# Patient Record
Sex: MALE | Race: WHITE | NOT HISPANIC OR LATINO | Employment: FULL TIME | URBAN - METROPOLITAN AREA
[De-identification: names, ages, dates, MRNs, and addresses within clinical notes are randomized per-mention and may not be internally consistent; named-entity substitution may affect disease eponyms.]

---

## 2019-12-26 ENCOUNTER — OFFICE VISIT (OUTPATIENT)
Dept: URGENT CARE | Facility: CLINIC | Age: 34
End: 2019-12-26

## 2019-12-26 VITALS
SYSTOLIC BLOOD PRESSURE: 137 MMHG | HEART RATE: 114 BPM | WEIGHT: 185 LBS | HEIGHT: 71 IN | OXYGEN SATURATION: 98 % | BODY MASS INDEX: 25.9 KG/M2 | TEMPERATURE: 100 F | DIASTOLIC BLOOD PRESSURE: 86 MMHG | RESPIRATION RATE: 17 BRPM

## 2019-12-26 DIAGNOSIS — L03.317 CELLULITIS OF BUTTOCK: Primary | ICD-10-CM

## 2019-12-26 PROCEDURE — 99214 PR OFFICE/OUTPT VISIT, EST, LEVL IV, 30-39 MIN: ICD-10-PCS | Mod: S$GLB,,, | Performed by: FAMILY MEDICINE

## 2019-12-26 PROCEDURE — 99214 OFFICE O/P EST MOD 30 MIN: CPT | Mod: S$GLB,,, | Performed by: FAMILY MEDICINE

## 2019-12-26 RX ORDER — SULFAMETHOXAZOLE AND TRIMETHOPRIM 800; 160 MG/1; MG/1
1 TABLET ORAL 2 TIMES DAILY
Qty: 20 TABLET | Refills: 0 | Status: SHIPPED | OUTPATIENT
Start: 2019-12-26 | End: 2020-01-05

## 2019-12-26 NOTE — PROGRESS NOTES
"Subjective:       Patient ID: Jim Patricia is a 34 y.o. male.    Vitals:  height is 5' 11" (1.803 m) and weight is 83.9 kg (185 lb). His oral temperature is 100.4 °F (38 °C) (abnormal). His blood pressure is 137/86 and his pulse is 114 (abnormal). His respiration is 17 and oxygen saturation is 98%.     Chief Complaint: Mass    Mass   This is a new problem. The current episode started in the past 7 days. The problem occurs constantly. The problem has been gradually worsening. Associated symptoms include a fever. Pertinent negatives include no arthralgias, chills, coughing, joint swelling, rash or sore throat. Nothing aggravates the symptoms. He has tried nothing for the symptoms.       Constitution: Positive for fever. Negative for chills.   HENT: Negative for facial swelling and sore throat.    Neck: Negative for painful lymph nodes.   Eyes: Negative for eye itching and eyelid swelling.   Respiratory: Negative for cough.    Musculoskeletal: Negative for joint pain and joint swelling.   Skin: Negative for color change, pale, rash, wound, abrasion, laceration, lesion, skin thickening/induration, puncture wound, abscess, avulsion and hives.   Allergic/Immunologic: Negative for environmental allergies, immunocompromised state and hives.   Hematologic/Lymphatic: Negative for swollen lymph nodes.       Objective:      Physical Exam   Constitutional: He is oriented to person, place, and time. He appears well-developed and well-nourished.   HENT:   Head: Normocephalic and atraumatic. Head is without abrasion, without contusion and without laceration.   Right Ear: External ear normal.   Left Ear: External ear normal.   Nose: Nose normal.   Mouth/Throat: Oropharynx is clear and moist and mucous membranes are normal.   Eyes: Pupils are equal, round, and reactive to light. Conjunctivae, EOM and lids are normal.   Neck: Trachea normal, full passive range of motion without pain and phonation normal. Neck supple. "   Cardiovascular: Normal rate, regular rhythm and normal heart sounds.   Pulmonary/Chest: Effort normal and breath sounds normal. No stridor. No respiratory distress.   Musculoskeletal: Normal range of motion.   Neurological: He is alert and oriented to person, place, and time.   Skin: Skin is warm, dry and intact. Capillary refill takes less than 2 seconds.   Right buttock has a 2-3 cm area of redness, tenderness, indurated, no drainage. abrasion, burn, bruising and ecchymosis  Psychiatric: He has a normal mood and affect. His speech is normal and behavior is normal. Judgment and thought content normal. Cognition and memory are normal.   Nursing note and vitals reviewed.        Assessment:       1. Cellulitis of buttock        Plan:         Cellulitis of buttock  -     sulfamethoxazole-trimethoprim 800-160mg (BACTRIM DS) 800-160 mg Tab; Take 1 tablet by mouth 2 (two) times daily. for 10 days  Dispense: 20 tablet; Refill: 0         Patient Instructions       Discharge Instructions for Cellulitis  You have been diagnosed with cellulitis. This is an infection in the deepest layer of the skin. In some cases, the infection also affects the muscle. Cellulitis is caused by bacteria. The bacteria can enter the body through broken skin. This can happen with a cut, scratch, animal bite, or an insect bite that has been scratched. You may have been treated in the hospital with antibiotics and fluids. You will likely be given a prescription for antibiotics to take at home. This sheet will help you take care of yourself at home.  Home care  When you are home:  · Take the prescribed antibiotic medicine you are given as directed until it is gone. Take it even if you feel better. It treats the infection and stops it from returning. Not taking all the medicine can make future infections hard to treat.  · Keep the infected area clean.  · When possible, raise the infected area above the level of your heart. This helps keep swelling  down.  · Talk with your healthcare provider if you are in pain. Ask what kind of over-the-counter medicine you can take for pain.  · Apply clean bandages as advised.  · Take your temperature once a day for a week.  · Wash your hands often to prevent spreading the infection.  In the future, wash your hands before and after you touch cuts, scratches, or bandages. This will help prevent infection.   When to call your healthcare provider  Call your healthcare provider immediately if you have any of the following:  · Difficulty or pain when moving the joints above or below the infected area  · Discharge or pus draining from the area  · Fever of 100.4°F (38°C) or higher, or as directed by your healthcare provider  · Pain that gets worse in or around the infected   · Redness that gets worse in or around the infected area, particularly if the area of redness expands to a wider area  · Shaking chills  · Swelling of the infected area  · Vomiting   Date Last Reviewed: 8/1/2016  © 4177-0900 [employer]. [address] [place] [address], [place] [address]. All rights reserved. This information is not intended as a substitute for professional medical care. Always follow your healthcare professional's instructions.      Follow up with your doctor in a few days as needed.  Return to the urgent care or go to the ER if symptoms get worse.    Jesse Juan MD

## 2019-12-26 NOTE — PATIENT INSTRUCTIONS
Discharge Instructions for Cellulitis  You have been diagnosed with cellulitis. This is an infection in the deepest layer of the skin. In some cases, the infection also affects the muscle. Cellulitis is caused by bacteria. The bacteria can enter the body through broken skin. This can happen with a cut, scratch, animal bite, or an insect bite that has been scratched. You may have been treated in the hospital with antibiotics and fluids. You will likely be given a prescription for antibiotics to take at home. This sheet will help you take care of yourself at home.  Home care  When you are home:  · Take the prescribed antibiotic medicine you are given as directed until it is gone. Take it even if you feel better. It treats the infection and stops it from returning. Not taking all the medicine can make future infections hard to treat.  · Keep the infected area clean.  · When possible, raise the infected area above the level of your heart. This helps keep swelling down.  · Talk with your healthcare provider if you are in pain. Ask what kind of over-the-counter medicine you can take for pain.  · Apply clean bandages as advised.  · Take your temperature once a day for a week.  · Wash your hands often to prevent spreading the infection.  In the future, wash your hands before and after you touch cuts, scratches, or bandages. This will help prevent infection.   When to call your healthcare provider  Call your healthcare provider immediately if you have any of the following:  · Difficulty or pain when moving the joints above or below the infected area  · Discharge or pus draining from the area  · Fever of 100.4°F (38°C) or higher, or as directed by your healthcare provider  · Pain that gets worse in or around the infected   · Redness that gets worse in or around the infected area, particularly if the area of redness expands to a wider area  · Shaking chills  · Swelling of the infected area  · Vomiting   Date Last Reviewed:  8/1/2016  © 4465-6237 [employer]. [address] [place] [address], [place] [address]. All rights reserved. This information is not intended as a substitute for professional medical care. Always follow your healthcare professional's instructions.      Follow up with your doctor in a few days as needed.  Return to the urgent care or go to the ER if symptoms get worse.    Jesse Juan MD

## 2020-05-08 ENCOUNTER — OFFICE VISIT (OUTPATIENT)
Dept: URGENT CARE | Facility: CLINIC | Age: 35
End: 2020-05-08

## 2020-05-08 VITALS
HEIGHT: 71 IN | SYSTOLIC BLOOD PRESSURE: 144 MMHG | BODY MASS INDEX: 25.9 KG/M2 | DIASTOLIC BLOOD PRESSURE: 84 MMHG | HEART RATE: 98 BPM | TEMPERATURE: 98 F | OXYGEN SATURATION: 98 % | WEIGHT: 185 LBS

## 2020-05-08 DIAGNOSIS — S93.402A SPRAIN OF LEFT ANKLE, UNSPECIFIED LIGAMENT, INITIAL ENCOUNTER: Primary | ICD-10-CM

## 2020-05-08 DIAGNOSIS — S99.912A INJURY OF LEFT ANKLE, INITIAL ENCOUNTER: ICD-10-CM

## 2020-05-08 PROCEDURE — 73610 X-RAY EXAM OF ANKLE: CPT | Mod: FY,LT,S$GLB, | Performed by: RADIOLOGY

## 2020-05-08 PROCEDURE — 99203 PR OFFICE/OUTPT VISIT, NEW, LEVL III, 30-44 MIN: ICD-10-PCS | Mod: TIER,S$GLB,, | Performed by: NURSE PRACTITIONER

## 2020-05-08 PROCEDURE — 73610 XR ANKLE COMPLETE 3 VIEW LEFT: ICD-10-PCS | Mod: FY,LT,S$GLB, | Performed by: RADIOLOGY

## 2020-05-08 PROCEDURE — 99203 OFFICE O/P NEW LOW 30 MIN: CPT | Mod: TIER,S$GLB,, | Performed by: NURSE PRACTITIONER

## 2020-05-08 RX ORDER — OMEPRAZOLE 10 MG/1
10 CAPSULE, DELAYED RELEASE ORAL DAILY
COMMUNITY

## 2020-05-08 NOTE — PATIENT INSTRUCTIONS
Ortho   If your condition worsens or fails to improve we recommend that you receive another evaluation at the ER immediately or contact your PCP to discuss your concerns or return here. You must understand that you've received an urgent care treatment only and that you may be released before all your medical problems are known or treated. You the patient will arrange for follouwp care as instructed.   If you were prescribed a narcotic or muscle relaxant do not drive or operate heavy machinery while taking these medications   Tylenol or ibuprofen can also be used as directed for pain unless you have an allergy to them or medical condition such as stomach ulcers, kidney or liver disease or blood thinners etc for which you should not be taking these type of medications.   If you were given a prescription NSAID here do not also take any over the counter NSAID like ibuprofen, aleve, advil, motrin etc   RICE which means rest, ice compression and elevation are helpful.   If you have Low Back Pain and develop bowel or bladder symptoms or increase pain going down your legs go to the ER immediately.   If you were given a splint wear it at all times.   If you were given crutches use them as we instructed. Do not rest your armpits on the foam pad or you risk compressing the nerves and the vessels there       Ankle Sprain (Adult)  An ankle sprain is a stretching or tearing of the ligaments that hold the ankle joint together. There are no broken bones.  An ankle sprain is a common injury for both children and adults. It happens when the ankle turns, twists, or rolls in an awkward way. This can be caused by a sports injury. Or it can happen from doing something as simple as stepping on an uneven surface.  Ligaments are made of tough connective tissue. Normally, ligaments stretch a certain amount and then go back to their normal place. A sprain happens when a ligament is  forced to stretch more than the normal amount. A severe sprain can actually tear the ligaments. If you have a severe sprain, you may have felt or heard something like a pop when you were injured.  Ankle sprains are given a grade depending on whether they are mild, moderate, or severe:  · Grade 1 sprain. A mild sprain with minor stretching and damage to the ligament.  · Grade 2 sprain. A moderate sprain where the ligament is partly torn.  · Grade 3 sprain. The most severe kind of sprain. The ligament is completely torn.  Most sprains take about 4 to 6 weeks to heal. A severe sprain can take several months to recover.  Your healthcare provider may order X-rays to be sure you dont have a fracture, or broken bone.  The injured area will feel sore.  Swelling and pain may make it hard to walk. You may need crutches if walking is painful. Or your provider may have you use a cast boot or air splint. This will depend on the grade of ankle sprain that you have.    Home care  · For a Grade 1 sprain, use RICE (rest, ice, compression, and elevation):  · Rest your ankle. Dont walk on it.  · Ice should be used right away to help control swelling. Place an ice pack over the injured area for 20 minutes. Do this every 3 to 6 hours for the first 24 to 48 hours. Keep using ice packs to ease pain and swelling as needed. To make an ice pack, put ice cubes in a plastic bag that seals at the top. Wrap the bag in a clean, thin towel or cloth. Never put ice or an ice pack directly on the skin. The ice pack can be put right on the cast, bandage, or splint. As the ice melts, be careful that the cast, bandage, or splint doesnt get wet. If you have a boot, open it to apply an ice pack, unless told otherwise by your provider.  · Compression devices help to control swelling. They also keep the ankle from moving and support your injured ankle. These devices include dressings, bandages, and wraps.  · Elevate or raise your ankle above the level of  your heart when sitting or lying down. This is very important for the first 48 hours.  · Follow the RICE guidelines for a Grade 2 sprain. This type of sprain will take longer to heal. Your provider may have you wear a splint, cast, or brace to keep your ankle from moving.  ·  If you have a Grade 3 sprain, you are at risk for long-term ankle instability. In rare cases, surgery may be needed. Your provider may have you wear a short leg cast or a walking boot for 2 to 3 weeks.  · After 48 hours, it may be helpful to apply heat for 20 minutes several times a day. You can do this with a heating pad or warm compress. Or you may want to go back and forth between using ice and heat. Never apply heat directly to the skin. Always wrap the heating pad or warm compress in a clean, thin towel or cloth.  · You may use over-the-counter pain medicine (NSAIDS or nonsteroidal anti-inflammatory drugs) to control pain, unless another pain medicine was prescribed. Talk with your provider before using these medicines if you have chronic liver or kidney disease, or have ever had a stomach ulcer or GI (gastrointestinal) bleeding.  · Follow any rehabilitation exercises your provider gives you. These can help you be more flexible and improve your balance and coordination. This is helpful in preventing long-term ankle problems.  Prevention  To help prevent ankle sprains, its important to have good strength, balance, and flexibility. Be sure to:  · Always warm up before you exercise or do something very active  · Be careful when walking or running on uneven or cracked surfaces  · Wear shoes that are in good condition and fit well  · Listen to your bodys signals to slow down when you are in pain or tired  Follow-up care  Any X-rays you had today dont show any broken bones, breaks, or fractures. Sometimes fractures dont show up on the first X-ray. Bruises and sprains can sometimes hurt as much as a fracture. These injuries can take time to  heal completely. If your symptoms dont get better or they get worse, talk with your healthcare provider. You may need a repeat X-ray.  Follow up with your healthcare provider, or as advised. Check for any warning signs listed below.  When to seek medical advice  Call your healthcare provider right away if any of these occur:  · Fever of 100.4 F (38 C) or higher, or as directed by your healthcare provider  · The injury doesnt seem to be healing  · The swelling comes back  · The cast has a bad smell  · The plaster cast or splint gets wet or soft  · The fiberglass cast or splint gets wet and does not dry for 24 hours  · The pain or swelling increases, or redness appears  · Your toes become cold, blue, numb, or tingly  · The skin is discolored (looks blue, purple, or gray), has blisters, or is irritated  · You re-injure your ankle  Date Last Reviewed: 11/20/2015  © 7862-0072 [employer]. [address] [place] [address], [place] [address]. All rights reserved. This information is not intended as a substitute for professional medical care. Always follow your healthcare professional's instructions.

## 2020-05-08 NOTE — LETTER
May 8, 2020      [facility]  [address] [facility] [address]  Phone: [phone]  Fax: [phone]       Patient: Jim Patricia   YOB: 1985  Date of Visit: 05/08/2020    To Whom It May Concern:    Steven Patricia  was at [facility] on 05/08/2020.  He is okay to return to work as scheduled on Wednesday 5/13/20. If you have any questions or concerns, or if I can be of further assistance, please do not hesitate to contact me.    Sincerely,        Marilee Ribeiro NP

## 2020-05-08 NOTE — PROGRESS NOTES
"Subjective:       Patient ID: Jim Patricia is a 34 y.o. male.    Vitals:  height is 5' 11" (1.803 m) and weight is 83.9 kg (185 lb). His tympanic temperature is 98.4 °F (36.9 °C). His blood pressure is 144/84 (abnormal) and his pulse is 98. His oxygen saturation is 98%.     Chief Complaint: Ankle Injury    Patient presents with c/o having an injury to his left ankle. Patient states that he was trying to stay busy at home when he tripped over a concrete slab and rolled his ankle yesterday morning. Patient has swelling and discoloration. Patient did ice his ankle and kept a wrap on it. Patient took tylenol and aleve and had some relief.   Patient is able to move his ankle up and down with no pain but any other movement causing pain.     Ankle Injury    The incident occurred 12 to 24 hours ago. The incident occurred at home. The pain is present in the left ankle. The pain is at a severity of 0/10. The patient is experiencing no pain. Pertinent negatives include no inability to bear weight, loss of motion, loss of sensation, muscle weakness, numbness or tingling. He reports no foreign bodies present. The symptoms are aggravated by movement. He has tried acetaminophen for the symptoms. The treatment provided no relief.       Constitution: Negative for fatigue.   HENT: Negative for facial swelling and facial trauma.    Neck: Negative for neck stiffness.   Cardiovascular: Negative for chest trauma.   Eyes: Negative for eye trauma, double vision and blurred vision.   Gastrointestinal: Negative for abdominal trauma, abdominal pain and rectal bleeding.   Genitourinary: Negative for hematuria, genital trauma and pelvic pain.   Musculoskeletal: Positive for pain, joint swelling and pain with walking. Negative for trauma and abnormal ROM of joint.   Skin: Positive for color change. Negative for wound, abrasion and laceration.   Neurological: Negative for dizziness, history of vertigo, light-headedness, coordination " disturbances, altered mental status, loss of consciousness and numbness.   Hematologic/Lymphatic: Negative for history of bleeding disorder.   Psychiatric/Behavioral: Negative for altered mental status.       Objective:      Physical Exam   Constitutional: He is oriented to person, place, and time. He appears well-developed and well-nourished. He is cooperative.  Non-toxic appearance. He does not appear ill. No distress.   HENT:   Head: Normocephalic and atraumatic. Head is without abrasion, without contusion and without laceration.   Right Ear: Hearing, tympanic membrane, external ear and ear canal normal. No hemotympanum.   Left Ear: Hearing, tympanic membrane, external ear and ear canal normal. No hemotympanum.   Nose: Nose normal. No mucosal edema, rhinorrhea or nasal deformity. No epistaxis. Right sinus exhibits no maxillary sinus tenderness and no frontal sinus tenderness. Left sinus exhibits no maxillary sinus tenderness and no frontal sinus tenderness.   Mouth/Throat: Uvula is midline, oropharynx is clear and moist and mucous membranes are normal. No trismus in the jaw. Normal dentition. No uvula swelling. No posterior oropharyngeal erythema.   Eyes: Pupils are equal, round, and reactive to light. Conjunctivae, EOM and lids are normal. Right eye exhibits no discharge. Left eye exhibits no discharge. No scleral icterus.   Neck: Trachea normal, normal range of motion, full passive range of motion without pain and phonation normal. Neck supple. No spinous process tenderness and no muscular tenderness present. No neck rigidity. No tracheal deviation present.   Cardiovascular: Normal rate, regular rhythm, normal heart sounds, intact distal pulses and normal pulses.   Pulmonary/Chest: Effort normal and breath sounds normal. No respiratory distress.   Abdominal: Soft. Normal appearance and bowel sounds are normal. He exhibits no distension, no pulsatile midline mass and no mass. There is no tenderness.    Musculoskeletal: Normal range of motion. He exhibits no edema or deformity.        Left ankle: He exhibits swelling. He exhibits normal range of motion, no ecchymosis, no deformity, no laceration and normal pulse. Tenderness. Lateral malleolus tenderness found. No medial malleolus, no AITFL, no CF ligament, no posterior TFL, no head of 5th metatarsal and no proximal fibula tenderness found. Achilles tendon normal.        Left foot: Normal.   Neurological: He is alert and oriented to person, place, and time. He has normal strength. No cranial nerve deficit or sensory deficit. He exhibits normal muscle tone. He displays no seizure activity. Coordination normal. GCS eye subscore is 4. GCS verbal subscore is 5. GCS motor subscore is 6.   Skin: Skin is warm, dry, intact, not diaphoretic and not pale. Capillary refill takes less than 2 seconds. not left ankleLesions:  bruisingabrasion, burn and ecchymosis  Psychiatric: He has a normal mood and affect. His speech is normal and behavior is normal. Judgment and thought content normal. Cognition and memory are normal.   Nursing note and vitals reviewed.  X-ray Ankle Complete Left    Result Date: 5/8/2020  EXAMINATION: XR ANKLE COMPLETE 3 VIEW LEFT CLINICAL HISTORY: Unspecified injury of left ankle, initial encounter TECHNIQUE: AP, lateral and oblique views of the left ankle were performed. COMPARISON: None FINDINGS: Bones are well mineralized. Overall alignment is within normal limits.  Nonspecific soft tissue swelling overlying the lateral malleolus.  Ankle mortise is otherwise well aligned and intact.  No displaced fracture, dislocation or destructive osseous process. Joint spaces appear relatively maintained. No subcutaneous emphysema or radiodense retained foreign body.     Lateral left ankle nonspecific soft tissue swelling without acute displaced fracture-dislocation identified, which may represent sprain. Electronically signed by: Henrry Good MD Date:    05/08/2020  Time:    18:35      Assessment:       1. Sprain of left ankle, unspecified ligament, initial encounter    2. Injury of left ankle, initial encounter        Plan:         Sprain of left ankle, unspecified ligament, initial encounter    Injury of left ankle, initial encounter  -     X-Ray Ankle Complete Left; Future; Expected date: 05/08/2020      Patient Instructions                                                              Ortho   If your condition worsens or fails to improve we recommend that you receive another evaluation at the ER immediately or contact your PCP to discuss your concerns or return here. You must understand that you've received an urgent care treatment only and that you may be released before all your medical problems are known or treated. You the patient will arrange for follouw care as instructed.   If you were prescribed a narcotic or muscle relaxant do not drive or operate heavy machinery while taking these medications   Tylenol or ibuprofen can also be used as directed for pain unless you have an allergy to them or medical condition such as stomach ulcers, kidney or liver disease or blood thinners etc for which you should not be taking these type of medications.   If you were given a prescription NSAID here do not also take any over the counter NSAID like ibuprofen, aleve, advil, motrin etc   RICE which means rest, ice compression and elevation are helpful.   If you have Low Back Pain and develop bowel or bladder symptoms or increase pain going down your legs go to the ER immediately.   If you were given a splint wear it at all times.   If you were given crutches use them as we instructed. Do not rest your armpits on the foam pad or you risk compressing the nerves and the vessels there       Ankle Sprain (Adult)  An ankle sprain is a stretching or tearing of the ligaments that hold the ankle joint together. There are no broken bones.  An ankle sprain is a common injury for both children  and adults. It happens when the ankle turns, twists, or rolls in an awkward way. This can be caused by a sports injury. Or it can happen from doing something as simple as stepping on an uneven surface.  Ligaments are made of tough connective tissue. Normally, ligaments stretch a certain amount and then go back to their normal place. A sprain happens when a ligament is forced to stretch more than the normal amount. A severe sprain can actually tear the ligaments. If you have a severe sprain, you may have felt or heard something like a pop when you were injured.  Ankle sprains are given a grade depending on whether they are mild, moderate, or severe:  · Grade 1 sprain. A mild sprain with minor stretching and damage to the ligament.  · Grade 2 sprain. A moderate sprain where the ligament is partly torn.  · Grade 3 sprain. The most severe kind of sprain. The ligament is completely torn.  Most sprains take about 4 to 6 weeks to heal. A severe sprain can take several months to recover.  Your healthcare provider may order X-rays to be sure you dont have a fracture, or broken bone.  The injured area will feel sore.  Swelling and pain may make it hard to walk. You may need crutches if walking is painful. Or your provider may have you use a cast boot or air splint. This will depend on the grade of ankle sprain that you have.    Home care  · For a Grade 1 sprain, use RICE (rest, ice, compression, and elevation):  · Rest your ankle. Dont walk on it.  · Ice should be used right away to help control swelling. Place an ice pack over the injured area for 20 minutes. Do this every 3 to 6 hours for the first 24 to 48 hours. Keep using ice packs to ease pain and swelling as needed. To make an ice pack, put ice cubes in a plastic bag that seals at the top. Wrap the bag in a clean, thin towel or cloth. Never put ice or an ice pack directly on the skin. The ice pack can be put right on the cast, bandage, or splint. As the ice melts, be  careful that the cast, bandage, or splint doesnt get wet. If you have a boot, open it to apply an ice pack, unless told otherwise by your provider.  · Compression devices help to control swelling. They also keep the ankle from moving and support your injured ankle. These devices include dressings, bandages, and wraps.  · Elevate or raise your ankle above the level of your heart when sitting or lying down. This is very important for the first 48 hours.  · Follow the RICE guidelines for a Grade 2 sprain. This type of sprain will take longer to heal. Your provider may have you wear a splint, cast, or brace to keep your ankle from moving.  ·  If you have a Grade 3 sprain, you are at risk for long-term ankle instability. In rare cases, surgery may be needed. Your provider may have you wear a short leg cast or a walking boot for 2 to 3 weeks.  · After 48 hours, it may be helpful to apply heat for 20 minutes several times a day. You can do this with a heating pad or warm compress. Or you may want to go back and forth between using ice and heat. Never apply heat directly to the skin. Always wrap the heating pad or warm compress in a clean, thin towel or cloth.  · You may use over-the-counter pain medicine (NSAIDS or nonsteroidal anti-inflammatory drugs) to control pain, unless another pain medicine was prescribed. Talk with your provider before using these medicines if you have chronic liver or kidney disease, or have ever had a stomach ulcer or GI (gastrointestinal) bleeding.  · Follow any rehabilitation exercises your provider gives you. These can help you be more flexible and improve your balance and coordination. This is helpful in preventing long-term ankle problems.  Prevention  To help prevent ankle sprains, its important to have good strength, balance, and flexibility. Be sure to:  · Always warm up before you exercise or do something very active  · Be careful when walking or running on uneven or cracked  surfaces  · Wear shoes that are in good condition and fit well  · Listen to your bodys signals to slow down when you are in pain or tired  Follow-up care  Any X-rays you had today dont show any broken bones, breaks, or fractures. Sometimes fractures dont show up on the first X-ray. Bruises and sprains can sometimes hurt as much as a fracture. These injuries can take time to heal completely. If your symptoms dont get better or they get worse, talk with your healthcare provider. You may need a repeat X-ray.  Follow up with your healthcare provider, or as advised. Check for any warning signs listed below.  When to seek medical advice  Call your healthcare provider right away if any of these occur:  · Fever of 100.4 F (38 C) or higher, or as directed by your healthcare provider  · The injury doesnt seem to be healing  · The swelling comes back  · The cast has a bad smell  · The plaster cast or splint gets wet or soft  · The fiberglass cast or splint gets wet and does not dry for 24 hours  · The pain or swelling increases, or redness appears  · Your toes become cold, blue, numb, or tingly  · The skin is discolored (looks blue, purple, or gray), has blisters, or is irritated  · You re-injure your ankle  Date Last Reviewed: 11/20/2015  © 2700-0943 [employer]. [address] [place] [id number]. All rights reserved. This information is not intended as a substitute for professional medical care. Always follow your healthcare professional's instructions.

## 2024-01-03 ENCOUNTER — TELEPHONE (OUTPATIENT)
Dept: FAMILY MEDICINE | Facility: CLINIC | Age: 39
End: 2024-01-03

## 2024-01-03 NOTE — TELEPHONE ENCOUNTER
Ret call to pt , dr briones said keep apt with jon lcaie.and then he could see him next week also if he wanted. Pt said ok he will keep apt lacie, thur with jon.

## 2024-01-04 ENCOUNTER — OFFICE VISIT (OUTPATIENT)
Dept: FAMILY MEDICINE | Facility: CLINIC | Age: 39
End: 2024-01-04

## 2024-01-04 VITALS
SYSTOLIC BLOOD PRESSURE: 128 MMHG | WEIGHT: 194 LBS | HEIGHT: 71 IN | TEMPERATURE: 99 F | OXYGEN SATURATION: 99 % | DIASTOLIC BLOOD PRESSURE: 86 MMHG | BODY MASS INDEX: 27.16 KG/M2 | RESPIRATION RATE: 18 BRPM | HEART RATE: 83 BPM

## 2024-01-04 DIAGNOSIS — F41.9 ANXIETY: Primary | ICD-10-CM

## 2024-01-04 DIAGNOSIS — K21.9 GASTROESOPHAGEAL REFLUX DISEASE, UNSPECIFIED WHETHER ESOPHAGITIS PRESENT: ICD-10-CM

## 2024-01-04 DIAGNOSIS — G44.209 TENSION HEADACHE: ICD-10-CM

## 2024-01-04 DIAGNOSIS — F41.0 PANIC ATTACKS: ICD-10-CM

## 2024-01-04 PROCEDURE — 99999 PR PBB SHADOW E&M-EST. PATIENT-LVL IV: CPT | Mod: PBBFAC,,,

## 2024-01-04 PROCEDURE — 99214 OFFICE O/P EST MOD 30 MIN: CPT | Mod: PBBFAC,PN

## 2024-01-04 PROCEDURE — 99214 OFFICE O/P EST MOD 30 MIN: CPT | Mod: S$PBB,,,

## 2024-01-04 RX ORDER — ESCITALOPRAM OXALATE 5 MG/1
5 TABLET ORAL DAILY
Qty: 30 TABLET | Refills: 1 | Status: SHIPPED | OUTPATIENT
Start: 2024-01-04 | End: 2025-01-03

## 2024-01-04 RX ORDER — PROPRANOLOL HYDROCHLORIDE 40 MG/1
40 TABLET ORAL 2 TIMES DAILY PRN
Qty: 60 TABLET | Refills: 0 | Status: SHIPPED | OUTPATIENT
Start: 2024-01-04 | End: 2024-02-01

## 2024-01-04 NOTE — PROGRESS NOTES
Subjective:       Patient ID: Jim Patricia is a 38 y.o. male.    Chief Complaint: Anxiety    Jim Patricia is a 38 y.o. male patient that presents to clinic for check up and with complaints of anxiety and panic attacks.  History of anxiety that is recently worsening. Has had some issues within his family. Has a history of drug and alcohol abuse but has been sober for 10 years. As far as anxiety he has tried valium in the past which did help.  He states that about every month or so he was having overwhelming feeling of anxiety that he was able to go outside and walk it off.  Recently he woke up out of his sleep with a panic attack and could not go back to sleep. Symptoms included shortness of breath and palpitations. He has been having some headaches that is like pressure around his head and he feels this may have initiated the worsening anxiety and panic.  Headache seems to start when his anxiety increases and go away when he starts to relax again.  He is taking over the counter medications also.       Review of patient's allergies indicates:  No Known Allergies  Social Determinants of Health     Tobacco Use: High Risk (1/4/2024)    Patient History     Smoking Tobacco Use: Some Days     Smokeless Tobacco Use: Never     Passive Exposure: Not on file   Alcohol Use: Not At Risk (5/8/2020)    AUDIT-C     Frequency of Alcohol Consumption: Never     Average Number of Drinks: Not on file     Frequency of Binge Drinking: Not on file   Financial Resource Strain: Not on file   Food Insecurity: Not on file   Transportation Needs: Not on file   Physical Activity: Not on file   Stress: Not on file   Social Connections: Not on file   Housing Stability: Not on file   Depression: Low Risk  (1/4/2024)    Depression     Last PHQ-4: Flowsheet Data: 0      History reviewed. No pertinent past medical history.   History reviewed. No pertinent surgical history.   Social History     Socioeconomic History    Marital status: Single          Current Outpatient Medications:     omeprazole (PRILOSEC) 10 MG capsule, Take 10 mg by mouth once daily., Disp: , Rfl:     EScitalopram oxalate (LEXAPRO) 5 MG Tab, Take 1 tablet (5 mg total) by mouth once daily., Disp: 30 tablet, Rfl: 1    propranoloL (INDERAL) 40 MG tablet, Take 1 tablet (40 mg total) by mouth 2 (two) times daily as needed (anxiety)., Disp: 60 tablet, Rfl: 0    Lab Results   Component Value Date    WBC 6.6 01/05/2024    HGB 16.9 01/05/2024    HCT 50.6 (H) 01/05/2024     01/05/2024    ALT 28 01/05/2024    AST 17 01/05/2024     01/05/2024    K 4.8 01/05/2024     01/05/2024    CREATININE 1.02 01/05/2024    BUN 22 01/05/2024    CO2 27 01/05/2024    TSH 1.97 01/05/2024       Review of Systems   Constitutional:  Negative for chills and fever.   Respiratory:  Positive for shortness of breath. Negative for chest tightness.    Cardiovascular:  Positive for palpitations. Negative for chest pain.   Gastrointestinal:  Positive for reflux.   Musculoskeletal: Negative.    Neurological:  Positive for headaches.   Psychiatric/Behavioral:  Negative for behavioral problems, dysphoric mood and suicidal ideas. The patient is nervous/anxious.        Objective:      Physical Exam  Vitals reviewed.   Constitutional:       Appearance: Normal appearance.   HENT:      Right Ear: Tympanic membrane normal.      Nose: Nose normal.      Mouth/Throat:      Mouth: Mucous membranes are moist.      Pharynx: Oropharynx is clear.   Eyes:      Conjunctiva/sclera: Conjunctivae normal.   Cardiovascular:      Rate and Rhythm: Normal rate and regular rhythm.      Pulses: Normal pulses.      Heart sounds: Normal heart sounds.   Pulmonary:      Effort: Pulmonary effort is normal.      Breath sounds: Normal breath sounds.   Abdominal:      General: Bowel sounds are normal.      Palpations: Abdomen is soft.      Tenderness: There is no abdominal tenderness.   Skin:     General: Skin is warm and dry.      Capillary  Refill: Capillary refill takes less than 2 seconds.   Neurological:      Mental Status: He is alert and oriented to person, place, and time.   Psychiatric:         Mood and Affect: Mood and affect normal.         Speech: Speech normal.         Behavior: Behavior normal. Behavior is cooperative.         Thought Content: Thought content normal. Thought content does not include homicidal or suicidal ideation.         Judgment: Judgment normal.         Assessment:       1. Anxiety    2. Panic attacks    3. Gastroesophageal reflux disease, unspecified whether esophagitis present    4. Tension headache        Plan:       Jim was seen today for anxiety.    Diagnoses and all orders for this visit:    Anxiety  -     TSH; Future  -     propranoloL (INDERAL) 40 MG tablet; Take 1 tablet (40 mg total) by mouth 2 (two) times daily as needed (anxiety).  -     EScitalopram oxalate (LEXAPRO) 5 MG Tab; Take 1 tablet (5 mg total) by mouth once daily.  -     TSH    Panic attacks  -     TSH; Future  -     propranoloL (INDERAL) 40 MG tablet; Take 1 tablet (40 mg total) by mouth 2 (two) times daily as needed (anxiety).    Gastroesophageal reflux disease, unspecified whether esophagitis present    Tension headache  -     CBC Auto Differential; Future  -     Comprehensive Metabolic Panel; Future  -     CBC Auto Differential  -     Comprehensive Metabolic Panel    Anxiety  - start lexapro 5 mg daily  - propranolol prn panic attack.    GERD  - continue omeprazole as needed.     Have labs. Follow up in 1 month or sooner if needed.

## 2024-01-05 ENCOUNTER — TELEPHONE (OUTPATIENT)
Dept: FAMILY MEDICINE | Facility: CLINIC | Age: 39
End: 2024-01-05

## 2024-01-05 ENCOUNTER — PATIENT MESSAGE (OUTPATIENT)
Dept: FAMILY MEDICINE | Facility: CLINIC | Age: 39
End: 2024-01-05

## 2024-01-06 LAB
ALBUMIN SERPL-MCNC: 4.6 G/DL (ref 3.6–5.1)
ALBUMIN/GLOB SERPL: 2 (CALC) (ref 1–2.5)
ALP SERPL-CCNC: 50 U/L (ref 36–130)
ALT SERPL-CCNC: 28 U/L (ref 9–46)
AST SERPL-CCNC: 17 U/L (ref 10–40)
BASOPHILS # BLD AUTO: 33 CELLS/UL (ref 0–200)
BASOPHILS NFR BLD AUTO: 0.5 %
BILIRUB SERPL-MCNC: 0.8 MG/DL (ref 0.2–1.2)
BUN SERPL-MCNC: 22 MG/DL (ref 7–25)
BUN/CREAT SERPL: NORMAL (CALC) (ref 6–22)
CALCIUM SERPL-MCNC: 10.1 MG/DL (ref 8.6–10.3)
CHLORIDE SERPL-SCNC: 103 MMOL/L (ref 98–110)
CO2 SERPL-SCNC: 27 MMOL/L (ref 20–32)
CREAT SERPL-MCNC: 1.02 MG/DL (ref 0.6–1.26)
EGFR: 98 ML/MIN/1.73M2
EOSINOPHIL # BLD AUTO: 73 CELLS/UL (ref 15–500)
EOSINOPHIL NFR BLD AUTO: 1.1 %
ERYTHROCYTE [DISTWIDTH] IN BLOOD BY AUTOMATED COUNT: 12.7 % (ref 11–15)
GLOBULIN SER CALC-MCNC: 2.3 G/DL (CALC) (ref 1.9–3.7)
GLUCOSE SERPL-MCNC: 98 MG/DL (ref 65–99)
HCT VFR BLD AUTO: 50.6 % (ref 38.5–50)
HGB BLD-MCNC: 16.9 G/DL (ref 13.2–17.1)
LYMPHOCYTES # BLD AUTO: 983 CELLS/UL (ref 850–3900)
LYMPHOCYTES NFR BLD AUTO: 14.9 %
MCH RBC QN AUTO: 27.4 PG (ref 27–33)
MCHC RBC AUTO-ENTMCNC: 33.4 G/DL (ref 32–36)
MCV RBC AUTO: 82.1 FL (ref 80–100)
MONOCYTES # BLD AUTO: 469 CELLS/UL (ref 200–950)
MONOCYTES NFR BLD AUTO: 7.1 %
NEUTROPHILS # BLD AUTO: 5042 CELLS/UL (ref 1500–7800)
NEUTROPHILS NFR BLD AUTO: 76.4 %
PLATELET # BLD AUTO: 328 THOUSAND/UL (ref 140–400)
PMV BLD REES-ECKER: 10.3 FL (ref 7.5–12.5)
POTASSIUM SERPL-SCNC: 4.8 MMOL/L (ref 3.5–5.3)
PROT SERPL-MCNC: 6.9 G/DL (ref 6.1–8.1)
RBC # BLD AUTO: 6.16 MILLION/UL (ref 4.2–5.8)
SODIUM SERPL-SCNC: 137 MMOL/L (ref 135–146)
TSH SERPL-ACNC: 1.97 MIU/L (ref 0.4–4.5)
WBC # BLD AUTO: 6.6 THOUSAND/UL (ref 3.8–10.8)

## 2024-01-08 ENCOUNTER — PATIENT MESSAGE (OUTPATIENT)
Dept: FAMILY MEDICINE | Facility: CLINIC | Age: 39
End: 2024-01-08

## 2024-01-08 DIAGNOSIS — R79.9 ABNORMAL BLOOD CHEMISTRY: Primary | ICD-10-CM

## 2024-01-09 NOTE — TELEPHONE ENCOUNTER
If he is feeling worse it would not be unreasonable to seek further help.  Please advise patient to go to inpatient facility if he feels he needs help.  If his symptoms are worsening with the lexapro he should stop taking.  If he does not go to inpatient psychiatric facility he will need to be seen.

## 2024-01-11 ENCOUNTER — OFFICE VISIT (OUTPATIENT)
Dept: FAMILY MEDICINE | Facility: CLINIC | Age: 39
End: 2024-01-11

## 2024-01-11 VITALS
BODY MASS INDEX: 26.56 KG/M2 | OXYGEN SATURATION: 98 % | HEART RATE: 72 BPM | TEMPERATURE: 98 F | HEIGHT: 71 IN | WEIGHT: 189.69 LBS

## 2024-01-11 DIAGNOSIS — Z13.1 SCREENING FOR DIABETES MELLITUS (DM): ICD-10-CM

## 2024-01-11 DIAGNOSIS — F41.0 PANIC ATTACKS: Primary | ICD-10-CM

## 2024-01-11 PROCEDURE — 99213 OFFICE O/P EST LOW 20 MIN: CPT | Mod: S$PBB,,, | Performed by: FAMILY MEDICINE

## 2024-01-11 PROCEDURE — 99213 OFFICE O/P EST LOW 20 MIN: CPT | Mod: PBBFAC,PN | Performed by: FAMILY MEDICINE

## 2024-01-11 PROCEDURE — 99999 PR PBB SHADOW E&M-EST. PATIENT-LVL III: CPT | Mod: PBBFAC,,, | Performed by: FAMILY MEDICINE

## 2024-01-11 RX ORDER — CLONAZEPAM 0.5 MG/1
0.5 TABLET ORAL DAILY PRN
Qty: 15 TABLET | Refills: 0 | Status: SHIPPED | OUTPATIENT
Start: 2024-01-11

## 2024-01-11 RX ORDER — ESZOPICLONE 2 MG/1
2 TABLET, FILM COATED ORAL NIGHTLY
Qty: 15 TABLET | Refills: 1 | Status: SHIPPED | OUTPATIENT
Start: 2024-01-11

## 2024-01-11 RX ORDER — ESZOPICLONE 2 MG/1
2 TABLET, FILM COATED ORAL NIGHTLY
COMMUNITY
End: 2024-01-11 | Stop reason: SDUPTHER

## 2024-01-11 RX ORDER — CLONAZEPAM 0.5 MG/1
0.5 TABLET ORAL DAILY PRN
COMMUNITY
End: 2024-01-11 | Stop reason: SDUPTHER

## 2024-01-14 PROBLEM — F41.0 PANIC ATTACKS: Status: ACTIVE | Noted: 2024-01-14

## 2024-01-14 NOTE — PROGRESS NOTES
Subjective:       Patient ID: Jim Patricia is a 38 y.o. male.    Chief Complaint: Anxiety    Having panic sober now for some time.  About 10 years.  Had an attack that lasted till 2 days ago.  Woke up with an episode last week anxious and panicky.  Seeing nurse practitioner.  On Lexapro.  No sleep due to the panic.  Took Lexapro about 4 days and stopped it.  Insomnia issues.  Went to the emergency room.  Given Ativan in the emergency room without any relief.  He consider going to the [facility].  Tried the Ativan again took 2 slept for few hours.  Had had prolonged insomnia.  No alcohol use since 2014.  TSH 1.97 CBC CMP BNP are normal.  EKG shows sinus rhythm at a rate of 78.  Has started therapy.  Took trazodone about 10 years ago.  Does go to  regularly.  BMI is 36.  Heart rate ran 120-130 at the maximum.      Physical examination.  Vital signs noted.  Calm no acute distress.  Neck without bruit.  Chest clear.  Heart regular rate and rhythm.  Abdomen bowel sounds positive soft nontender.  Extremities without edema.          Objective:        Assessment:       1. Panic attacks    2. BMI 26.0-26.9,adult    3. Screening for diabetes mellitus (DM)        Plan:       Panic attacks    BMI 26.0-26.9,adult    Screening for diabetes mellitus (DM)  -     Hemoglobin A1C; Future; Expected date: 01/11/2024    Other orders  -     clonazePAM (KLONOPIN) 0.5 MG tablet; Take 1 tablet (0.5 mg total) by mouth daily as needed for Anxiety.  Dispense: 15 tablet; Refill: 0  -     eszopiclone (LUNESTA) 2 MG Tab; Take 1 tablet (2 mg total) by mouth every evening.  Dispense: 15 tablet; Refill: 1      Check A1c.  BuSpar if needed.  Klonopin 0.5 daily maximum 15 pills.  Lunesta 2 mg HS 15 with a refill.  Follow-up in 2-3 weeks.

## 2024-02-01 ENCOUNTER — OFFICE VISIT (OUTPATIENT)
Dept: FAMILY MEDICINE | Facility: CLINIC | Age: 39
End: 2024-02-01

## 2024-02-01 VITALS
TEMPERATURE: 99 F | WEIGHT: 194.25 LBS | HEART RATE: 96 BPM | SYSTOLIC BLOOD PRESSURE: 120 MMHG | BODY MASS INDEX: 27.19 KG/M2 | HEIGHT: 71 IN | DIASTOLIC BLOOD PRESSURE: 70 MMHG | OXYGEN SATURATION: 98 %

## 2024-02-01 DIAGNOSIS — K21.9 GASTROESOPHAGEAL REFLUX DISEASE, UNSPECIFIED WHETHER ESOPHAGITIS PRESENT: ICD-10-CM

## 2024-02-01 DIAGNOSIS — F41.0 PANIC ATTACKS: Primary | ICD-10-CM

## 2024-02-01 DIAGNOSIS — G47.00 INSOMNIA, UNSPECIFIED TYPE: ICD-10-CM

## 2024-02-01 PROCEDURE — 99214 OFFICE O/P EST MOD 30 MIN: CPT | Mod: PBBFAC,PN | Performed by: FAMILY MEDICINE

## 2024-02-01 PROCEDURE — 99213 OFFICE O/P EST LOW 20 MIN: CPT | Mod: S$PBB,,, | Performed by: FAMILY MEDICINE

## 2024-02-01 PROCEDURE — 99999 PR PBB SHADOW E&M-EST. PATIENT-LVL IV: CPT | Mod: PBBFAC,,, | Performed by: FAMILY MEDICINE

## 2024-02-03 PROBLEM — G47.00 INSOMNIA: Status: ACTIVE | Noted: 2024-02-03

## 2024-02-03 PROBLEM — K21.9 GASTROESOPHAGEAL REFLUX DISEASE: Status: ACTIVE | Noted: 2024-02-03

## 2024-02-03 NOTE — PROGRESS NOTES
Subjective:       Patient ID: Jim Patricia is a 38 y.o. male.    Chief Complaint: Follow-up (2 wk fu on med panic attacks  not really having to take med.)    Follow-up of panic attacks.  Has not needed the Klonopin simply having it with him has helped.  In therapy.  And AA.  Insomnia Lunesta about 5 doses.  Then started sleeping without it okay since then.  Gastroesophageal reflux disease some reflux at night.  No dysphagia.    Physical examination.  Vital signs noted.  Neck without bruit.  Chest clear.  Heart regular rate and rhythm.  Extremities without edema.        Objective:        Assessment:       1. Panic attacks    2. Insomnia, unspecified type    3. Gastroesophageal reflux disease, unspecified whether esophagitis present    4. BMI 27.0-27.9,adult        Plan:       Panic attacks    Insomnia, unspecified type  -     Lipid Panel; Future; Expected date: 02/01/2024  -     Hemoglobin A1C; Future; Expected date: 02/01/2024    Gastroesophageal reflux disease, unspecified whether esophagitis present    BMI 27.0-27.9,adult  -     Lipid Panel; Future; Expected date: 02/01/2024  -     Hemoglobin A1C; Future; Expected date: 02/01/2024    Lunesta p.r.n. Klonopin p.r.n..  BuSpar if needed.  Continue therapy.  Lipids and A1c ordered.  Follow-up as needed.

## 2024-05-10 ENCOUNTER — OFFICE VISIT (OUTPATIENT)
Dept: FAMILY MEDICINE | Facility: CLINIC | Age: 39
End: 2024-05-10

## 2024-05-10 VITALS
WEIGHT: 210.19 LBS | TEMPERATURE: 98 F | BODY MASS INDEX: 29.43 KG/M2 | HEART RATE: 76 BPM | OXYGEN SATURATION: 98 % | SYSTOLIC BLOOD PRESSURE: 136 MMHG | HEIGHT: 71 IN | DIASTOLIC BLOOD PRESSURE: 88 MMHG | RESPIRATION RATE: 18 BRPM

## 2024-05-10 DIAGNOSIS — R11.0 NAUSEA: Primary | ICD-10-CM

## 2024-05-10 PROCEDURE — 99214 OFFICE O/P EST MOD 30 MIN: CPT | Mod: PBBFAC,PN | Performed by: FAMILY MEDICINE

## 2024-05-10 PROCEDURE — 99999 PR PBB SHADOW E&M-EST. PATIENT-LVL IV: CPT | Mod: PBBFAC,,, | Performed by: FAMILY MEDICINE

## 2024-05-10 PROCEDURE — 99213 OFFICE O/P EST LOW 20 MIN: CPT | Mod: S$PBB,,, | Performed by: FAMILY MEDICINE

## 2024-05-10 RX ORDER — ONDANSETRON 4 MG/1
8 TABLET, FILM COATED ORAL EVERY 6 HOURS PRN
Qty: 20 TABLET | Refills: 0 | Status: SHIPPED | OUTPATIENT
Start: 2024-05-10

## 2024-05-10 RX ORDER — PANTOPRAZOLE SODIUM 40 MG/1
40 TABLET, DELAYED RELEASE ORAL DAILY
Qty: 30 TABLET | Refills: 1 | Status: SHIPPED | OUTPATIENT
Start: 2024-05-10 | End: 2025-05-10

## 2024-05-12 NOTE — PROGRESS NOTES
Subjective:       Patient ID: Jim Patricia is a 38 y.o. male.    Chief Complaint: Diarrhea, Abdominal Pain, Bloated, Nausea, and Generalized Body Aches    One-month history of diarrhea alternating with some soft to hard stools.  Cramping and bloating.  He tried eating chicken and rice for several days was better on that and then worsened again when he went off of it.  Nausea in the morning.  Started to work today got very nauseated turned around and came home.  And called here for an appointment.  No fever chills.  Did discomfort in the abdomen moves around.  There has no bleeding he is gained 20 lb.  Does not have insurance so wants minimal workup.  Last bowel movement an hour ago was soft.  Family history is negative for colon cancer there is no history of alcohol use.  BMI is 29.      Physical examination.  Vital signs noted.  Neck without bruit.  Chest clear.  Heart regular rate rhythm.  Abdomen bowel sounds are positive soft nontender no hepatosplenomegaly no guarding no rebound.  Extremities without edema.        Objective:        Assessment:       1. Nausea    2. BMI 29.0-29.9,adult        Plan:       Nausea  -     CBC Auto Differential; Future; Expected date: 05/10/2024  -     Comprehensive Metabolic Panel; Future; Expected date: 05/10/2024  -     Lipid Panel; Future; Expected date: 05/10/2024  -     TSH; Future; Expected date: 05/10/2024  -     Hemoglobin A1C; Future; Expected date: 05/10/2024  -     Lipase; Future; Expected date: 05/10/2024  -     Cancel: US Abdomen Limited_Gallbladder; Future; Expected date: 05/10/2024    BMI 29.0-29.9,adult  -     CBC Auto Differential; Future; Expected date: 05/10/2024  -     Comprehensive Metabolic Panel; Future; Expected date: 05/10/2024  -     Lipid Panel; Future; Expected date: 05/10/2024  -     TSH; Future; Expected date: 05/10/2024  -     Hemoglobin A1C; Future; Expected date: 05/10/2024  -     Lipase; Future; Expected date: 05/10/2024    Other orders  -      pantoprazole (PROTONIX) 40 MG tablet; Take 1 tablet (40 mg total) by mouth once daily.  Dispense: 30 tablet; Refill: 1  -     ondansetron (ZOFRAN) 4 MG tablet; Take 2 tablets (8 mg total) by mouth every 6 (six) hours as needed for Nausea.  Dispense: 20 tablet; Refill: 0    Low-fat diet discussed.  CBC CMP lipase lipids TSH A1c.  Protonix 40 mg daily 30 with a refill.  Zofran 4 mg q.6 hours p.r.n..  May do ultrasound of gallbladder at [facility] if the labs are normal.

## 2024-05-20 ENCOUNTER — PATIENT MESSAGE (OUTPATIENT)
Dept: FAMILY MEDICINE | Facility: CLINIC | Age: 39
End: 2024-05-20

## 2024-05-20 DIAGNOSIS — R11.0 NAUSEA: Primary | ICD-10-CM

## 2024-06-24 RX ORDER — ONDANSETRON 4 MG/1
8 TABLET, FILM COATED ORAL EVERY 6 HOURS PRN
Qty: 20 TABLET | Refills: 0 | Status: SHIPPED | OUTPATIENT
Start: 2024-06-24

## 2024-06-24 NOTE — TELEPHONE ENCOUNTER
No care due was identified.  [facility] Embedded Care Due Messages. Reference number: [id number].   6/24/2024 9:50:31 AM CDT

## 2024-06-25 ENCOUNTER — TELEPHONE (OUTPATIENT)
Dept: PRIMARY CARE CLINIC | Facility: CLINIC | Age: 39
End: 2024-06-25

## 2024-06-25 NOTE — TELEPHONE ENCOUNTER
Pharmacist advised that 8mg Zofran every 6 hrs is too much, pharmacist recommended 4mg every 6hrs, I advised ok.

## 2024-06-25 NOTE — TELEPHONE ENCOUNTER
----- Message from Devante Narayanan sent at 6/25/2024  8:35 AM CDT -----  Regarding: pharmacy  Contact: [facility]  Type:  Pharmacy Calling to Clarify an RX    Name of Caller:  [id number] [address]  [address] [facility] [address]  Phone: [phone] Fax: [phone]  Pharmacy Name:  Prescription Name:ondansetron (ZOFRAN) 4 MG tablet  What do they need to clarify?:Directions  Best Call Back Number:  Additional Information:

## 2024-07-05 NOTE — TELEPHONE ENCOUNTER
No care due was identified.  [facility] Embedded Care Due Messages. Reference number: [id number].   7/05/2024 8:55:42 AM CDT

## 2024-07-06 RX ORDER — PANTOPRAZOLE SODIUM 40 MG/1
40 TABLET, DELAYED RELEASE ORAL
Qty: 30 TABLET | Refills: 1 | Status: SHIPPED | OUTPATIENT
Start: 2024-07-06

## 2024-07-06 NOTE — TELEPHONE ENCOUNTER
Refill Routing Note   Medication(s) are not appropriate for processing by [facility] for the following reason(s):        New or recently adjusted medication    [facility] action(s):  Defer             Appointments  past 12m or future 3m with PCP    Date Provider   Last Visit   5/10/2024 Lloyd Amado III, MD   Next Visit   7/5/2024 Lloyd Amado III, MD   ED visits in past 90 days: 0        Note composed:10:23 PM 07/05/2024

## 2024-08-19 NOTE — TELEPHONE ENCOUNTER
No care due was identified.  [facility] Embedded Care Due Messages. Reference number: [id number].   8/19/2024 10:49:34 AM CDT

## 2024-08-20 ENCOUNTER — TELEPHONE (OUTPATIENT)
Dept: FAMILY MEDICINE | Facility: CLINIC | Age: 39
End: 2024-08-20

## 2024-08-20 RX ORDER — ONDANSETRON 4 MG/1
8 TABLET, FILM COATED ORAL EVERY 6 HOURS PRN
Qty: 20 TABLET | Refills: 0 | Status: SHIPPED | OUTPATIENT
Start: 2024-08-20

## 2024-08-20 NOTE — TELEPHONE ENCOUNTER
Refill Routing Note   Medication(s) are not appropriate for processing by [facility] for the following reason(s):        Outside of protocol    [facility] action(s):  Route               Appointments  past 12m or future 3m with PCP    Date Provider   Last Visit   5/10/2024 Lloyd Amado III, MD   Next Visit   Visit date not found Lloyd Amado III, MD   ED visits in past 90 days: 0        Note composed:2:10 AM 08/20/2024

## 2024-08-20 NOTE — TELEPHONE ENCOUNTER
----- Message from Keli Graham sent at 8/20/2024  9:45 AM CDT -----  Regarding: Needs return call  Type: Needs Medical Advice  Who Called:  [facility]    Pharmacy name and phone #:    [id number] - [place], [place] - [address]  [address] [address]  Phone: [phone] Fax: [phone]      Best Call Back Number: [phone]  Additional Information: They need to clarify the directions for the zofran medication please call to jose f, the dose looks too high

## 2024-08-21 ENCOUNTER — TELEPHONE (OUTPATIENT)
Dept: FAMILY MEDICINE | Facility: CLINIC | Age: 39
End: 2024-08-21

## 2024-08-29 ENCOUNTER — E-VISIT (OUTPATIENT)
Dept: FAMILY MEDICINE | Facility: CLINIC | Age: 39
End: 2024-08-29

## 2024-08-29 DIAGNOSIS — J01.90 ACUTE SINUSITIS, RECURRENCE NOT SPECIFIED, UNSPECIFIED LOCATION: Primary | ICD-10-CM

## 2024-08-29 DIAGNOSIS — R09.81 NASAL CONGESTION: ICD-10-CM

## 2024-08-29 DIAGNOSIS — R05.1 ACUTE COUGH: ICD-10-CM

## 2024-08-30 ENCOUNTER — TELEPHONE (OUTPATIENT)
Dept: FAMILY MEDICINE | Facility: CLINIC | Age: 39
End: 2024-08-30

## 2024-08-30 RX ORDER — LORATADINE 10 MG/1
10 TABLET ORAL DAILY
Qty: 30 TABLET | Refills: 0 | Status: SHIPPED | OUTPATIENT
Start: 2024-08-30 | End: 2025-08-30

## 2024-08-30 RX ORDER — FLUTICASONE PROPIONATE 50 MCG
1 SPRAY, SUSPENSION (ML) NASAL DAILY
Qty: 16 G | Refills: 0 | Status: SHIPPED | OUTPATIENT
Start: 2024-08-30

## 2024-08-30 RX ORDER — BENZONATATE 200 MG/1
200 CAPSULE ORAL 3 TIMES DAILY PRN
Qty: 30 CAPSULE | Refills: 0 | Status: SHIPPED | OUTPATIENT
Start: 2024-08-30 | End: 2024-09-09

## 2024-08-30 RX ORDER — DOXYCYCLINE 100 MG/1
100 CAPSULE ORAL EVERY 12 HOURS
Qty: 20 CAPSULE | Refills: 0 | Status: SHIPPED | OUTPATIENT
Start: 2024-08-30 | End: 2024-09-09

## 2024-08-30 NOTE — PROGRESS NOTES
Patient presents to [facility] with complaints of sinus congestion, cough, pressure in his face, and headaches that seem to be worsening. He is also having yellow thick mucous.  He has tried Dayquil and Nyquil for his symptoms with some relief.  He is leaving to go out of town for work tomorrow and is wanting medication.  He did a home covid test that was negative.  I have sent doxycycline for sinus infection.  Claritin and Flonase for sinus congestion and pressure.  Tessalon for cough.  He can also use Delsym if this is not effective.  He can use tylenol for headaches or fever.  Recommend he follow up in clinic if his symptoms worsen or do not improve.

## 2024-08-30 NOTE — TELEPHONE ENCOUNTER
----- Message from Nina Castañeda sent at 8/30/2024  3:51 PM CDT -----  Contact: pt [phone]  Type: Needs Medical Advice  Who Called:  Pt     Best Call Back Number: [phone]    Additional Information: Pt stated he sent in negative covid results through pt portal  and was asking if he will be receiving an rx. Pls call back and advise       [facility] #[id number] [facility] [address] [place] [address]  [address] [place] [address]  Phone: [phone] Fax: [phone]

## 2024-09-05 RX ORDER — PANTOPRAZOLE SODIUM 40 MG/1
40 TABLET, DELAYED RELEASE ORAL
Qty: 90 TABLET | Refills: 2 | Status: SHIPPED | OUTPATIENT
Start: 2024-09-05

## 2024-09-05 NOTE — TELEPHONE ENCOUNTER
Refill Routing Note   Medication(s) are not appropriate for processing by [facility] for the following reason(s):        No active prescription written by provider    [facility] action(s):  Defer               Appointments  past 12m or future 3m with PCP    Date Provider   Last Visit   5/10/2024 Lloyd Amado III, MD   Next Visit   Visit date not found Lloyd Amaod III, MD   ED visits in past 90 days: 0        Note composed:12:00 PM 09/05/2024

## 2024-10-26 DIAGNOSIS — R09.81 NASAL CONGESTION: ICD-10-CM

## 2024-10-26 DIAGNOSIS — J01.90 ACUTE SINUSITIS, RECURRENCE NOT SPECIFIED, UNSPECIFIED LOCATION: ICD-10-CM

## 2024-10-26 RX ORDER — FLUTICASONE PROPIONATE 50 MCG
SPRAY, SUSPENSION (ML) NASAL
Qty: 16 G | Refills: 0 | Status: SHIPPED | OUTPATIENT
Start: 2024-10-26

## 2024-10-31 RX ORDER — ONDANSETRON 4 MG/1
8 TABLET, FILM COATED ORAL EVERY 6 HOURS PRN
Qty: 20 TABLET | Refills: 0 | Status: SHIPPED | OUTPATIENT
Start: 2024-10-31

## 2024-11-25 ENCOUNTER — PATIENT MESSAGE (OUTPATIENT)
Dept: FAMILY MEDICINE | Facility: CLINIC | Age: 39
End: 2024-11-25

## 2024-11-26 ENCOUNTER — OFFICE VISIT (OUTPATIENT)
Dept: FAMILY MEDICINE | Facility: CLINIC | Age: 39
End: 2024-11-26

## 2024-11-26 VITALS
HEIGHT: 71 IN | OXYGEN SATURATION: 96 % | TEMPERATURE: 98 F | DIASTOLIC BLOOD PRESSURE: 80 MMHG | SYSTOLIC BLOOD PRESSURE: 138 MMHG | WEIGHT: 209.75 LBS | HEART RATE: 69 BPM | BODY MASS INDEX: 29.36 KG/M2

## 2024-11-26 DIAGNOSIS — K58.9 IRRITABLE BOWEL SYNDROME, UNSPECIFIED TYPE: Primary | ICD-10-CM

## 2024-11-26 DIAGNOSIS — F41.9 ANXIETY: ICD-10-CM

## 2024-11-26 PROCEDURE — 99999 PR PBB SHADOW E&M-EST. PATIENT-LVL IV: CPT | Mod: PBBFAC,,, | Performed by: FAMILY MEDICINE

## 2024-11-26 PROCEDURE — 99214 OFFICE O/P EST MOD 30 MIN: CPT | Mod: PBBFAC,PN | Performed by: FAMILY MEDICINE

## 2024-11-26 PROCEDURE — 99213 OFFICE O/P EST LOW 20 MIN: CPT | Mod: S$PBB,,, | Performed by: FAMILY MEDICINE

## 2024-11-26 RX ORDER — BUSPIRONE HYDROCHLORIDE 5 MG/1
5 TABLET ORAL 2 TIMES DAILY
Qty: 60 TABLET | Refills: 1 | Status: SHIPPED | OUTPATIENT
Start: 2024-11-26

## 2024-11-26 RX ORDER — BUSPIRONE HYDROCHLORIDE 5 MG/1
5 TABLET ORAL 2 TIMES DAILY
COMMUNITY
End: 2024-11-26 | Stop reason: SDUPTHER

## 2024-11-26 RX ORDER — HYOSCYAMINE SULFATE 0.12 MG/1
0.12 TABLET SUBLINGUAL EVERY 6 HOURS PRN
Qty: 20 TABLET | Refills: 0 | Status: SHIPPED | OUTPATIENT
Start: 2024-11-26 | End: 2024-11-29

## 2024-11-26 RX ORDER — CLONAZEPAM 0.5 MG/1
0.5 TABLET ORAL DAILY PRN
Qty: 15 TABLET | Refills: 0 | Status: SHIPPED | OUTPATIENT
Start: 2024-11-26

## 2024-11-26 RX ORDER — HYOSCYAMINE SULFATE 0.12 MG/1
0.12 TABLET SUBLINGUAL EVERY 6 HOURS PRN
COMMUNITY
End: 2024-11-26 | Stop reason: SDUPTHER

## 2024-11-29 PROBLEM — K58.9 IRRITABLE BOWEL SYNDROME: Status: ACTIVE | Noted: 2024-11-29

## 2024-11-29 PROBLEM — F41.9 ANXIETY: Status: ACTIVE | Noted: 2024-11-29

## 2024-11-29 RX ORDER — HYOSCYAMINE SULFATE 0.12 MG/1
TABLET SUBLINGUAL
Qty: 20 TABLET | Refills: 0 | Status: SHIPPED | OUTPATIENT
Start: 2024-11-29

## 2024-11-29 NOTE — PROGRESS NOTES
Subjective:       Patient ID: Jim Patricia is a 39 y.o. male.    Chief Complaint: Medication Refill    Follow-up anxiety.  Klonopin p.r.n. now.  No issues with dependence.  Very careful with it.  Has a few left 4 5 of the last prescription.  Needs refill.  Irritable bowel syndrome.  Cramping and diarrhea with some foods.  But can not identify specific ones.  Bloating will wake him up at night and then have diarrhea.  Three episodes in 4 5 months.  Then gets anxiety over no sleep.  Working for a security company.  BMI is 29.    Physical examination.  Vital signs are noted.  No acute distress.  Neck without bruit no adenopathy.  Chest clear.  Heart regular rate and rhythm.  Abdomen bowel sounds are positive soft nontender.  Extremities without edema.        Objective:        Assessment:       1. Irritable bowel syndrome, unspecified type    2. Anxiety    3. BMI 29.0-29.9,adult        Plan:       Irritable bowel syndrome, unspecified type    Anxiety  -     clonazePAM (KLONOPIN) 0.5 MG tablet; Take 1 tablet (0.5 mg total) by mouth daily as needed for Anxiety.  Dispense: 15 tablet; Refill: 0    BMI 29.0-29.9,adult    Other orders  -     busPIRone (BUSPAR) 5 MG Tab; Take 1 tablet (5 mg total) by mouth 2 (two) times daily.  Dispense: 60 tablet; Refill: 1  -     Discontinue: hyoscyamine 0.125 mg Subl; Place 1 tablet (0.125 mg total) under the tongue every 6 (six) hours as needed (cramps).  Dispense: 20 tablet; Refill: 0      Refill Klonopin 0.5 15 pills.  BuSpar 5 mg b.i.d. 60 with a refill.  Increase the BuSpar in one-month to 10 b.i.d. contact us about this.  Left send sublingually q.6h p.r.n. for his IBS symptoms.  Workup further when he is able.

## 2024-11-29 NOTE — TELEPHONE ENCOUNTER
No care due was identified.  [facility] Embedded Care Due Messages. Reference number: [id number].   11/29/2024 3:25:52 AM CST

## 2024-12-01 RX ORDER — HYOSCYAMINE SULFATE 0.12 MG/1
TABLET SUBLINGUAL
Qty: 20 TABLET | Refills: 0 | Status: SHIPPED | OUTPATIENT
Start: 2024-12-01

## 2024-12-01 NOTE — TELEPHONE ENCOUNTER
No care due was identified.  [facility] Embedded Care Due Messages. Reference number: [id number].   12/01/2024 3:27:51 AM CST

## 2024-12-03 RX ORDER — HYOSCYAMINE SULFATE 0.12 MG/1
TABLET SUBLINGUAL
Qty: 20 TABLET | Refills: 0 | OUTPATIENT
Start: 2024-12-03

## 2024-12-03 NOTE — TELEPHONE ENCOUNTER
No care due was identified.  [facility] Embedded Care Due Messages. Reference number: [id number].   12/03/2024 3:26:22 AM CST

## 2024-12-03 NOTE — TELEPHONE ENCOUNTER
Refill Decision Note   Jim Patricia  is requesting a refill authorization.  Brief Assessment and Rationale for Refill:  Quick Discontinue     Medication Therapy Plan:         Comments:     Note composed:11:45 AM 12/03/2024             Appointments     Last Visit   11/26/2024 Lloyd Amado III, MD   Next Visit   Visit date not found Lloyd Amado III, MD

## 2024-12-18 RX ORDER — ONDANSETRON 4 MG/1
8 TABLET, FILM COATED ORAL EVERY 6 HOURS PRN
Qty: 20 TABLET | Refills: 0 | Status: SHIPPED | OUTPATIENT
Start: 2024-12-18

## 2024-12-18 RX ORDER — BUSPIRONE HYDROCHLORIDE 5 MG/1
5 TABLET ORAL 2 TIMES DAILY
Qty: 60 TABLET | Refills: 1 | Status: SHIPPED | OUTPATIENT
Start: 2024-12-18

## 2024-12-18 NOTE — TELEPHONE ENCOUNTER
No care due was identified.  [facility] Embedded Care Due Messages. Reference number: [id number].   12/18/2024 2:27:31 PM CST

## 2024-12-28 DIAGNOSIS — J01.90 ACUTE SINUSITIS, RECURRENCE NOT SPECIFIED, UNSPECIFIED LOCATION: ICD-10-CM

## 2024-12-28 DIAGNOSIS — R09.81 NASAL CONGESTION: ICD-10-CM

## 2024-12-28 NOTE — TELEPHONE ENCOUNTER
No care due was identified.  [facility] Embedded Care Due Messages. Reference number: [id number].   12/28/2024 3:27:07 AM CST

## 2024-12-29 RX ORDER — FLUTICASONE PROPIONATE 50 MCG
SPRAY, SUSPENSION (ML) NASAL
Qty: 16 G | Refills: 0 | OUTPATIENT
Start: 2024-12-29

## 2024-12-29 NOTE — TELEPHONE ENCOUNTER
Refill Decision Note   Jim Patricia  is requesting a refill authorization.  Brief Assessment and Rationale for Refill:  Quick Discontinue     Medication Therapy Plan:  prescription was discontinued on 11/29/2024 by Lloyd Amado III, MD      Comments:     Note composed:2:06 PM 12/29/2024

## 2025-01-23 RX ORDER — BUSPIRONE HYDROCHLORIDE 5 MG/1
5 TABLET ORAL 2 TIMES DAILY
Qty: 60 TABLET | Refills: 1 | Status: SHIPPED | OUTPATIENT
Start: 2025-01-23

## 2025-01-23 RX ORDER — ONDANSETRON 4 MG/1
8 TABLET, FILM COATED ORAL EVERY 6 HOURS PRN
Qty: 20 TABLET | Refills: 0 | Status: SHIPPED | OUTPATIENT
Start: 2025-01-23

## 2025-01-23 NOTE — TELEPHONE ENCOUNTER
No care due was identified.  [facility] Embedded Care Due Messages. Reference number: [id number].   1/23/2025 2:01:40 PM CST

## 2025-02-24 RX ORDER — ONDANSETRON 4 MG/1
8 TABLET, FILM COATED ORAL EVERY 6 HOURS PRN
Qty: 20 TABLET | Refills: 0 | Status: SHIPPED | OUTPATIENT
Start: 2025-02-24

## 2025-02-25 NOTE — TELEPHONE ENCOUNTER
No care due was identified.  [facility] Embedded Care Due Messages. Reference number: [id number].   2/24/2025 7:59:05 PM CST

## 2025-04-27 RX ORDER — BUSPIRONE HYDROCHLORIDE 5 MG/1
5 TABLET ORAL 2 TIMES DAILY
Qty: 60 TABLET | Refills: 0 | Status: SHIPPED | OUTPATIENT
Start: 2025-04-27

## 2025-04-27 NOTE — TELEPHONE ENCOUNTER
No care due was identified.  [facility] Embedded Care Due Messages. Reference number: [id number].   4/27/2025 7:23:07 AM CDT

## 2025-04-28 RX ORDER — ONDANSETRON 4 MG/1
8 TABLET, FILM COATED ORAL EVERY 6 HOURS PRN
Qty: 20 TABLET | Refills: 0 | Status: SHIPPED | OUTPATIENT
Start: 2025-04-28

## 2025-04-28 NOTE — TELEPHONE ENCOUNTER
No care due was identified.  [facility] Embedded Care Due Messages. Reference number: [id number].   4/28/2025 8:50:28 AM CDT

## 2025-06-08 RX ORDER — PANTOPRAZOLE SODIUM 40 MG/1
40 TABLET, DELAYED RELEASE ORAL
Qty: 90 TABLET | Refills: 1 | Status: SHIPPED | OUTPATIENT
Start: 2025-06-08

## 2025-06-08 NOTE — TELEPHONE ENCOUNTER
No care due was identified.  [facility] Embedded Care Due Messages. Reference number: [id number].   6/08/2025 9:59:34 AM CDT

## 2025-06-09 NOTE — TELEPHONE ENCOUNTER
Refill Decision Note   Jim Patricia  is requesting a refill authorization.  Brief Assessment and Rationale for Refill:  Approve     Medication Therapy Plan:         Comments:     Note composed:9:45 PM 06/08/2025             Appointments     Last Visit   11/26/2024 Lloyd Amado III, MD   Next Visit   Visit date not found Lloyd Amado III, MD

## 2025-07-11 RX ORDER — PANTOPRAZOLE SODIUM 40 MG/1
40 TABLET, DELAYED RELEASE ORAL DAILY
Qty: 30 TABLET | Refills: 0 | Status: SHIPPED | OUTPATIENT
Start: 2025-07-11

## 2025-07-11 RX ORDER — ONDANSETRON 4 MG/1
8 TABLET, FILM COATED ORAL EVERY 6 HOURS PRN
Qty: 20 TABLET | Refills: 0 | Status: SHIPPED | OUTPATIENT
Start: 2025-07-11

## 2025-07-11 RX ORDER — BUSPIRONE HYDROCHLORIDE 5 MG/1
5 TABLET ORAL 2 TIMES DAILY
Qty: 60 TABLET | Refills: 0 | Status: SHIPPED | OUTPATIENT
Start: 2025-07-11

## 2025-07-11 NOTE — TELEPHONE ENCOUNTER
No care due was identified.  [facility] Embedded Care Due Messages. Reference number: [id number].   7/11/2025 4:15:53 PM CDT

## 2025-07-13 RX ORDER — PANTOPRAZOLE SODIUM 40 MG/1
40 TABLET, DELAYED RELEASE ORAL
Qty: 90 TABLET | OUTPATIENT
Start: 2025-07-13

## 2025-07-13 NOTE — TELEPHONE ENCOUNTER
Refill Decision Note   Jim Patricia  is requesting a refill authorization.  Brief Assessment and Rationale for Refill:  Quick Discontinue     Medication Therapy Plan:       Medication Reconciliation Completed: No   Comments:     No Care Gaps recommended.     Note composed:10:53 AM 07/13/2025

## 2025-07-14 RX ORDER — ONDANSETRON 4 MG/1
8 TABLET, FILM COATED ORAL EVERY 8 HOURS PRN
Qty: 20 TABLET | Refills: 0 | Status: SHIPPED | OUTPATIENT
Start: 2025-07-14

## 2025-07-15 ENCOUNTER — OFFICE VISIT (OUTPATIENT)
Dept: FAMILY MEDICINE | Facility: CLINIC | Age: 40
End: 2025-07-15
Payer: COMMERCIAL

## 2025-07-15 VITALS
TEMPERATURE: 98 F | HEIGHT: 71 IN | OXYGEN SATURATION: 98 % | SYSTOLIC BLOOD PRESSURE: 126 MMHG | WEIGHT: 209.44 LBS | DIASTOLIC BLOOD PRESSURE: 78 MMHG | HEART RATE: 83 BPM | BODY MASS INDEX: 29.32 KG/M2

## 2025-07-15 DIAGNOSIS — F41.0 PANIC ATTACKS: ICD-10-CM

## 2025-07-15 DIAGNOSIS — K58.9 IRRITABLE BOWEL SYNDROME, UNSPECIFIED TYPE: ICD-10-CM

## 2025-07-15 DIAGNOSIS — G47.00 INSOMNIA, UNSPECIFIED TYPE: ICD-10-CM

## 2025-07-15 DIAGNOSIS — Z00.00 PHYSICAL EXAM: Primary | ICD-10-CM

## 2025-07-15 DIAGNOSIS — F41.9 ANXIETY: ICD-10-CM

## 2025-07-15 PROCEDURE — 1159F MED LIST DOCD IN RCRD: CPT | Mod: CPTII,S$GLB,, | Performed by: FAMILY MEDICINE

## 2025-07-15 PROCEDURE — 99999 PR PBB SHADOW E&M-EST. PATIENT-LVL III: CPT | Mod: PBBFAC,,, | Performed by: FAMILY MEDICINE

## 2025-07-15 PROCEDURE — 3074F SYST BP LT 130 MM HG: CPT | Mod: CPTII,S$GLB,, | Performed by: FAMILY MEDICINE

## 2025-07-15 PROCEDURE — 3078F DIAST BP <80 MM HG: CPT | Mod: CPTII,S$GLB,, | Performed by: FAMILY MEDICINE

## 2025-07-15 PROCEDURE — 99395 PREV VISIT EST AGE 18-39: CPT | Mod: S$GLB,,, | Performed by: FAMILY MEDICINE

## 2025-07-15 PROCEDURE — 1160F RVW MEDS BY RX/DR IN RCRD: CPT | Mod: CPTII,S$GLB,, | Performed by: FAMILY MEDICINE

## 2025-07-15 PROCEDURE — 3008F BODY MASS INDEX DOCD: CPT | Mod: CPTII,S$GLB,, | Performed by: FAMILY MEDICINE

## 2025-07-15 RX ORDER — HYOSCYAMINE SULFATE 0.12 MG/1
0.12 TABLET SUBLINGUAL 3 TIMES DAILY
Qty: 20 TABLET | Refills: 2 | Status: SHIPPED | OUTPATIENT
Start: 2025-07-15

## 2025-07-15 RX ORDER — CLONAZEPAM 0.5 MG/1
0.5 TABLET ORAL DAILY PRN
Qty: 15 TABLET | Refills: 0 | Status: SHIPPED | OUTPATIENT
Start: 2025-07-15

## 2025-07-15 NOTE — PROGRESS NOTES
SCRIBE #1 NOTE: I, Rick Guzman, am scribing for, and in the presence of,  Lloyd Amado III, MD. I have scribed the entire note.     Subjective:       Patient ID: Jim Patricia is a 40 y.o. male.    Chief Complaint: Follow-up (Med refills)    Mr. Patricia is here for a follow up with his last appointment being here on November 26, 2024.     Social history: Nonsmoker. No alcohol intake of significance. He is working with [employer]. He is cycling.     Family history: Hypothyroidism. Anxiety.     Immunizations: Tdap is past due.     Past medical history: BMI of 29.21. Cardiovascular good. No chest pain. No palpitations. Blood pressure is 126/78. IBS. Takes Levsin as needed. Reports that pizza and cheese can cause him issues. He states it seems to be how much he eats. Anxiety. He has been off Buspar for 4 days now and has not noticed a change without it. Insomnia. Not taking the Lunesta for 6 months now. Panic attack. Needs Klonopin as needed. Left medication in [facility].     Review of Systems   Constitutional:  Negative for chills and fever.   HENT:  Negative for congestion and sore throat.    Eyes:  Negative for pain and visual disturbance.   Respiratory:  Negative for chest tightness and shortness of breath.    Cardiovascular:  Negative for chest pain.   Gastrointestinal:  Negative for nausea.   Endocrine: Negative for polydipsia and polyuria.   Genitourinary:  Negative for dysuria and flank pain.   Musculoskeletal:  Negative for back pain, neck pain and neck stiffness.   Skin:  Negative for rash.   Allergic/Immunologic: Negative for immunocompromised state.   Neurological:  Negative for dizziness and weakness.   Hematological:  Does not bruise/bleed easily.   Psychiatric/Behavioral:  Negative for agitation and behavioral problems.    All other systems reviewed and are negative.      Objective:      Physical Exam  Vitals and nursing note reviewed.   Constitutional:       Appearance: Normal appearance. He  is well-developed and normal weight.   HENT:      Head: Normocephalic and atraumatic.      Right Ear: Tympanic membrane normal.      Left Ear: Tympanic membrane normal.      Nose: Nose normal.      Mouth/Throat:      Mouth: Mucous membranes are moist.   Eyes:      Conjunctiva/sclera: Conjunctivae normal.      Pupils: Pupils are equal, round, and reactive to light.   Neck:      Vascular: No carotid bruit.   Cardiovascular:      Rate and Rhythm: Normal rate and regular rhythm.      Pulses: Normal pulses.      Heart sounds: Normal heart sounds. No murmur heard.    No gallop.   Pulmonary:      Effort: Pulmonary effort is normal.      Breath sounds: Normal breath sounds.   Abdominal:      General: Bowel sounds are normal.      Palpations: Abdomen is soft.      Tenderness: There is no abdominal tenderness.   Musculoskeletal:         General: Normal range of motion.      Cervical back: Normal range of motion.      Right lower leg: No edema.      Left lower leg: No edema.   Lymphadenopathy:      Cervical: No cervical adenopathy.   Skin:     General: Skin is warm and dry.   Neurological:      General: No focal deficit present.      Mental Status: He is alert and oriented to person, place, and time.   Psychiatric:         Behavior: Behavior normal.         Thought Content: Thought content normal.         Judgment: Judgment normal.       Assessment:       1. Physical exam    2. Anxiety    3. Irritable bowel syndrome, unspecified type    4. Insomnia, unspecified type    5. BMI 29.0-29.9,adult    6. Panic attacks        Plan:       Physical exam  -     CBC Auto Differential; Future; Expected date: 07/15/2025  -     Comprehensive Metabolic Panel; Future; Expected date: 07/15/2025  -     Lipid Panel; Future; Expected date: 07/15/2025  -     TSH; Future; Expected date: 07/15/2025  -     Hemoglobin A1C; Future; Expected date: 07/15/2025    Anxiety    Irritable bowel syndrome, unspecified type    Insomnia, unspecified type    BMI  29.0-29.9,adult    Panic attacks    Overall doing well.  Refill his Klonopin 0.5 mg 15 pills.  Refill his sublingually Levsin 0.125 20 with 2 refills.  Do labs CBC CMP lipids TSH and A1c.  Okay to stay off the BuSpar.  He is not noticing that it was doing anything.  He also needs a TD AP which he declines.  All care gaps addressed or discussed.     I, Lloyd Amado III, MD, personally performed the services described in this documentation. All medical record entries made by the scribe were at my direction and in my presence. I have reviewed the chart and agree that the record reflects my personal performance and is accurate and complete.

## 2025-08-11 RX ORDER — PANTOPRAZOLE SODIUM 40 MG/1
40 TABLET, DELAYED RELEASE ORAL
Qty: 90 TABLET | Refills: 3 | Status: SHIPPED | OUTPATIENT
Start: 2025-08-11

## 2025-08-11 RX ORDER — BUSPIRONE HYDROCHLORIDE 5 MG/1
5 TABLET ORAL 2 TIMES DAILY
Qty: 60 TABLET | Refills: 0 | Status: SHIPPED | OUTPATIENT
Start: 2025-08-11